# Patient Record
Sex: FEMALE | Race: WHITE | NOT HISPANIC OR LATINO | URBAN - NONMETROPOLITAN AREA
[De-identification: names, ages, dates, MRNs, and addresses within clinical notes are randomized per-mention and may not be internally consistent; named-entity substitution may affect disease eponyms.]

---

## 2021-07-12 ENCOUNTER — OFFICE VISIT (OUTPATIENT)
Dept: URBAN - NONMETROPOLITAN AREA CLINIC 18 | Facility: CLINIC | Age: 51
End: 2021-07-12
Payer: COMMERCIAL

## 2021-07-12 DIAGNOSIS — H52.13 MYOPIA, BILATERAL: Primary | ICD-10-CM

## 2021-07-12 DIAGNOSIS — H43.812 VITREOUS DEGENERATION, LEFT EYE: ICD-10-CM

## 2021-07-12 DIAGNOSIS — H17.89 OTHER CORNEAL SCARS AND OPACITIES: ICD-10-CM

## 2021-07-12 DIAGNOSIS — H25.813 COMBINED FORMS OF AGE-RELATED CATARACT, BILATERAL: ICD-10-CM

## 2021-07-12 PROCEDURE — 92004 COMPRE OPH EXAM NEW PT 1/>: CPT | Performed by: OPTOMETRIST

## 2021-07-12 ASSESSMENT — VISUAL ACUITY
OS: 20/20
OD: 20/20

## 2021-07-12 ASSESSMENT — INTRAOCULAR PRESSURE
OS: 14
OD: 12

## 2021-07-12 NOTE — IMPRESSION/PLAN
Encounter Date: 2018       History     Chief Complaint   Patient presents with    Chest Pain     right side chest wall pain since fri and yest right shoulder pain     The history is provided by the patient.   Chest Pain   The current episode started several days ago. Duration of episode(s) is 3 days. Chest pain occurs intermittently. The chest pain is unchanged. The pain is associated with stress. Pain scale at highest: mild. Pain scale currently: mild. The quality of the pain is described as aching. The pain does not radiate. Chest pain is worsened by certain positions. Primary symptoms include cough. Pertinent negatives for primary symptoms include no fever, no fatigue, no syncope, no shortness of breath, no wheezing, no palpitations, no abdominal pain, no nausea, no vomiting, no dizziness and no altered mental status.   The cough began today. The cough is non-productive. There is nondescript sputum produced. It is exacerbated by allergens.     Pertinent negatives for associated symptoms include no claudication, no diaphoresis, no lower extremity edema, no near-syncope, no numbness, no orthopnea, no paroxysmal nocturnal dyspnea and no weakness. She tried nothing for the symptoms. Risk factors include sedentary lifestyle.   Her past medical history is significant for CAD, hyperlipidemia, hypertension, MI and strokes.   Procedure history is positive for cardiac catheterization, echocardiogram and stress thallium.   Procedure history is negative for persantine thallium, stress echo, exercise treadmill test and coronary CTA.     Review of patient's allergies indicates:  No Known Allergies  Past Medical History:   Diagnosis Date    GERD (gastroesophageal reflux disease)     High cholesterol     Hypertension     MI (myocardial infarction)     Stroke      Past Surgical History:   Procedure Laterality Date    ANGIOPLASTY       SECTION      x 2    HEART CATH WITH GRAFTS-LEFT Left 2015     Impression: Other corneal scars and opacities: H17.89. Plan: Monitor. Performed by R[facility]id [facility]ero [facility] at [facility] C[facility]    HYSTERECTOMY       History reviewed. No pertinent family history.  Social History     Tobacco Use    Smoking status: Never Smoker    Smokeless tobacco: Never Used   Substance Use Topics    Alcohol use: Yes     Comment: occasionally    Drug use: No     Review of Systems   Constitutional: Negative for diaphoresis, fatigue and fever.   HENT: Negative for sore throat.    Respiratory: Positive for cough. Negative for shortness of breath and wheezing.    Cardiovascular: Positive for chest pain. Negative for palpitations, orthopnea, claudication, syncope and near-syncope.   Gastrointestinal: Negative for abdominal pain, nausea and vomiting.   Genitourinary: Negative for dysuria.   Musculoskeletal: Negative for back pain.   Skin: Negative for rash.   Neurological: Negative for dizziness, weakness and numbness.   Hematological: Does not bruise/bleed easily.   All other systems reviewed and are negative.      Physical Exam     Initial Vitals [11/19/18 1502]   BP Pulse Resp Temp SpO2   (!) 158/74 (!) 57 16 98 °F (36.7 °C) 100 %      MAP       --         Physical Exam    Nursing note and vitals reviewed.  Constitutional: She appears well-developed and well-nourished.   HENT:   Head: Normocephalic and atraumatic.   Mouth/Throat: No oropharyngeal exudate.   Eyes: Conjunctivae and EOM are normal. Pupils are equal, round, and reactive to light.   Neck: Normal range of motion. Neck supple. No thyromegaly present.   Cardiovascular: Regular rhythm, normal heart sounds and intact distal pulses. Bradycardia present.  Exam reveals no gallop and no friction rub.    No murmur heard.  Bradycardia is a chronic issue   Pulmonary/Chest: Breath sounds normal. No respiratory distress. She has no wheezes. She has no rhonchi. She exhibits bony tenderness (reproduces pt's complaint). She exhibits no tenderness.   Abdominal: Soft. Bowel sounds are normal. She exhibits no distension. There is  no tenderness. There is no rebound and no guarding.   Musculoskeletal: Normal range of motion. She exhibits no edema or tenderness.   Lymphadenopathy:     She has no cervical adenopathy.   Neurological: She is alert and oriented to person, place, and time. She has normal strength. No cranial nerve deficit or sensory deficit.   Skin: Skin is warm and dry. No rash noted.   Psychiatric: She has a normal mood and affect. Her behavior is normal. Judgment and thought content normal.         ED Course   Procedures  Labs Reviewed   CBC W/ AUTO DIFFERENTIAL - Abnormal; Notable for the following components:       Result Value    Hemoglobin 11.9 (*)     MCH 26.4 (*)     All other components within normal limits   TROPONIN I - Abnormal; Notable for the following components:    Troponin I 0.549 (*)     All other components within normal limits   TROPONIN I - Abnormal; Notable for the following components:    Troponin I 0.572 (*)     All other components within normal limits   COMPREHENSIVE METABOLIC PANEL   B-TYPE NATRIURETIC PEPTIDE   APTT   PROTIME-INR   PROTIME-INR   APTT     EKG Readings: (Independently Interpreted)   Initial Reading: No STEMI. Rhythm: Normal Sinus Rhythm. Heart Rate: 50. Ectopy: No Ectopy. Conduction: Normal. ST Segments: Normal ST Segments. T Waves: Normal. Axis: Normal. Clinical Impression: Normal Sinus Rhythm       Imaging Results          X-Ray Chest AP Portable (Final result)  Result time 11/19/18 15:58:21    Final result by Pierre Tomas MD (11/19/18 15:58:21)                 Impression:      In comparison to the prior study, there is no adverse interval changes      Electronically signed by: Pierre Tomas MD  Date:    11/19/2018  Time:    15:58             Narrative:    EXAMINATION:  XR CHEST AP PORTABLE    CLINICAL HISTORY:  Chest Pain;    TECHNIQUE:  Single frontal view of the chest was performed.    COMPARISON:  09/26/2017    FINDINGS:  Heart size is within mildly enlarged but stable.   "Elevation left hemidiaphragm noted with left lower lobe atelectasis.  Remaining lung fields are clear.  No pleural effusion or pneumothorax.  Bones demonstrate mild degenerative changes.    In comparison to the prior study, there is no adverse interval changes.                                    Vitals:    11/19/18 1502 11/19/18 1508 11/19/18 1546 11/19/18 1616   BP: (!) 158/74  131/62 (!) 134/58   Pulse: (!) 57 (!) 53 (!) 55 (!) 55   Resp: 16  20 20   Temp: 98 °F (36.7 °C)  98.6 °F (37 °C) 98.2 °F (36.8 °C)   TempSrc: Oral  Oral Oral   SpO2: 100%  100% 100%   Weight: 84.8 kg (186 lb 15.2 oz)      Height: 5' 1" (1.549 m)       11/19/18 1731 11/19/18 1802 11/19/18 1902   BP: (!) 127/58  (!) 148/65   Pulse: (!) 52  (!) 54   Resp: (!) 22 20 20   Temp: 98.2 °F (36.8 °C) 98.4 °F (36.9 °C) 98.6 °F (37 °C)   TempSrc: Oral Oral Oral   SpO2: 99%  98%   Weight:      Height:          Results for orders placed or performed during the hospital encounter of 11/19/18   CBC auto differential   Result Value Ref Range    WBC 10.48 3.90 - 12.70 K/uL    RBC 4.51 4.00 - 5.40 M/uL    Hemoglobin 11.9 (L) 12.0 - 16.0 g/dL    Hematocrit 37.2 37.0 - 48.5 %    MCV 83 82 - 98 fL    MCH 26.4 (L) 27.0 - 31.0 pg    MCHC 32.0 32.0 - 36.0 g/dL    RDW 14.0 11.5 - 14.5 %    Platelets 259 150 - 350 K/uL    MPV 10.4 9.2 - 12.9 fL    Gran # (ANC) 6.4 1.8 - 7.7 K/uL    Lymph # 3.2 1.0 - 4.8 K/uL    Mono # 0.7 0.3 - 1.0 K/uL    Eos # 0.2 0.0 - 0.5 K/uL    Baso # 0.01 0.00 - 0.20 K/uL    Gran% 61.0 38.0 - 73.0 %    Lymph% 30.1 18.0 - 48.0 %    Mono% 7.1 4.0 - 15.0 %    Eosinophil% 1.5 0.0 - 8.0 %    Basophil% 0.1 0.0 - 1.9 %    Differential Method Automated    Comprehensive metabolic panel   Result Value Ref Range    Sodium 141 136 - 145 mmol/L    Potassium 3.8 3.5 - 5.1 mmol/L    Chloride 108 95 - 110 mmol/L    CO2 25 23 - 29 mmol/L    Glucose 102 70 - 110 mg/dL    BUN, Bld 13 6 - 20 mg/dL    Creatinine 0.9 0.5 - 1.4 mg/dL    Calcium 9.0 8.7 - 10.5 mg/dL "    Total Protein 7.1 6.0 - 8.4 g/dL    Albumin 3.6 3.5 - 5.2 g/dL    Total Bilirubin 0.3 0.1 - 1.0 mg/dL    Alkaline Phosphatase 65 55 - 135 U/L    AST 15 10 - 40 U/L    ALT 12 10 - 44 U/L    Anion Gap 8 8 - 16 mmol/L    eGFR if African American >60.0 >60 mL/min/1.73 m^2    eGFR if non African American >60.0 >60 mL/min/1.73 m^2   Troponin I #1   Result Value Ref Range    Troponin I 0.549 (H) 0.000 - 0.026 ng/mL   B-Type natriuretic peptide (BNP)   Result Value Ref Range    BNP 21 0 - 99 pg/mL   Troponin I #2   Result Value Ref Range    Troponin I 0.572 (H) 0.000 - 0.026 ng/mL   Protime-INR   Result Value Ref Range    Prothrombin Time 10.3 9.0 - 12.5 sec    INR 1.0 0.8 - 1.2   APTT   Result Value Ref Range    aPTT 28.0 21.0 - 32.0 sec         Imaging Results          X-Ray Chest AP Portable (Final result)  Result time 11/19/18 15:58:21    Final result by Pierre Tomas MD (11/19/18 15:58:21)                 Impression:      In comparison to the prior study, there is no adverse interval changes      Electronically signed by: Pierre Tomas MD  Date:    11/19/2018  Time:    15:58             Narrative:    EXAMINATION:  XR CHEST AP PORTABLE    CLINICAL HISTORY:  Chest Pain;    TECHNIQUE:  Single frontal view of the chest was performed.    COMPARISON:  09/26/2017    FINDINGS:  Heart size is within mildly enlarged but stable.  Elevation left hemidiaphragm noted with left lower lobe atelectasis.  Remaining lung fields are clear.  No pleural effusion or pneumothorax.  Bones demonstrate mild degenerative changes.    In comparison to the prior study, there is no adverse interval changes.                                Medications   aspirin tablet 325 mg (325 mg Oral Given 11/19/18 4209)   heparin 25,000 units in dextrose 5% (100 units/ml) IV bolus from bag INITIAL BOLUS (max bolus 4000 units) (3,756 Units Intravenous Bolus from Bag 11/19/18 1628)         4:13 PM  - Re-evaluation:  The patient is resting comfortably and is in  no acute distress. Discussed test results and notified of pending labs. Answered questions at this time. Pt requests to go to [facility] where her cardiologist, Dr. Clemente, has privileges.    5:55 PM  - Re-evaluation: The patient is resting comfortably and is in no acute distress. Informed patient and family that per pt request, will be transferred to [facility]. Notified of test results and need of transfer to another facility with available service(s). They understand and agree with the plan as discussed. Answered questions at this time.      5:55 PM  - Consult: Dr. Frost discussed the case with Dr. Bowie at [facility]. Will accept transfer of the patient.  Accepting Facility/Service: [facility]   Accepting Physician: Dr. Bowie     All historical, clinical, radiographic, and laboratory findings were reviewed with the patient/family in detail along with the indications for transfer to an outside facility (rather than admission to our facility in [place]) secondary to NSTEMI and a need for  Cardiology/hosp med given the diagnosis of NSTEMI.  All remaining questions and concerns were addressed at that time and the patient/family communicates understanding and agrees to proceed accordingly.  Similarly all pertinent details of the encounter were discussed with Dr. Bowie at [facility] who agrees to accept the patient in transfer based on the needs/patient preferences outlined above.  [facility] secondary to a need for ongoing cardiac monitoring and pulse ox en route.  Camden Frost MD  5:56 PM     Pre-hypertension/Hypertension: The pt has been informed that they may have pre-hypertension or hypertension based on a blood pressure reading in the ED. I recommend that the pt call the PCP listed on their discharge instructions or a physician of their choice this week to arrange f/u for further evaluation of possible pre-hypertension or hypertension.     Scooter ANDERSON  Don was given a handout which discussed their disease process, precautions, and instructions for follow-up and therapy.           Medication List      ASK your doctor about these medications    ASPIR-81 ORAL     CeleXA 20 MG tablet  Generic drug:  citalopram     cyanocobalamin (vitamin B-12) 50 mcg tablet     lisinopril 10 MG tablet     omeprazole 40 MG capsule  Commonly known as:  PRILOSEC     ondansetron 4 MG Tbdl  Commonly known as:  ZOFRAN-ODT  Take 1 tablet (4 mg total) by mouth every 6 (six) hours as needed (nausea).     VITAMIN D-3 ORAL           Current Discharge Medication List            ED Diagnosis  1. NSTEMI (non-ST elevated myocardial infarction)    2. Chest pain    3. Elevated troponin                             Clinical Impression:   The primary encounter diagnosis was NSTEMI (non-ST elevated myocardial infarction). Diagnoses of Chest pain and Elevated troponin were also pertinent to this visit.      Disposition:   Disposition: Transferred  Condition: Stable                        Camden Frost Jr., MD  11/20/18 9545

## 2021-07-12 NOTE — IMPRESSION/PLAN
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 Plan: The condition was explained to patient. There is no evidence of retinal pathology. All signs and risks of retinal detachment and tears were discussed in detail. Patient instructed to call office immediately if any symptoms noted. No further treatment required unless signs/symptoms of retinal detachment develop.